# Patient Record
Sex: MALE | Race: WHITE | Employment: FULL TIME | URBAN - METROPOLITAN AREA
[De-identification: names, ages, dates, MRNs, and addresses within clinical notes are randomized per-mention and may not be internally consistent; named-entity substitution may affect disease eponyms.]

---

## 2017-01-04 ENCOUNTER — HOSPITAL ENCOUNTER (OUTPATIENT)
Dept: PHYSICAL THERAPY | Age: 52
Discharge: HOME | End: 2017-01-04
Attending: FAMILY MEDICINE
Payer: COMMERCIAL

## 2017-01-04 PROCEDURE — 97110 THERAPEUTIC EXERCISES: CPT

## 2017-01-04 PROCEDURE — 97140 MANUAL THERAPY 1/> REGIONS: CPT

## 2017-01-04 NOTE — PROGRESS NOTES
Ricardo Crooks  : 1965 [facility] at [address] [place] [address]  [phone]   [phone]         OUTPATIENT PHYSICAL THERAPY: Daily Note 2017   ICD-10: Treatment Diagnosis: Low back pain (M54.5)  , Lumbago with sciatica, left side (M54.42)  Precautions/Allergies:   Review of patient's allergies indicates no known allergies. Fall Risk Score: 2 (? 5 = High Risk)  MD Orders: evaluate and treat MEDICAL/REFERRING DIAGNOSIS:  Persons encountering health services in other specified circumstances [Z76.89]   DATE OF ONSET: three months ago   REFERRING PHYSICIAN: Mary Hernandez MD  RETURN PHYSICIAN APPOINTMENT:    Initial Assessment:  Mr. Pedro Rendon presents with decreased lumbar spine ROM, decreased postural awareness, difficulty with sitting, and decreased hip mobility. He will benefit from skilled PT to address his current impairments and functional limitations. PROBLEM LIST (Impacting functional limitations):  1. Decreased ADL/Functional Activities  2. Decreased Transfer Abilities  3. Decreased Ambulation Ability/Technique  4. Increased Pain  5. Decreased Activity Tolerance  6. Decreased Flexibility/Joint Mobility  7. impaired body mechanics with transfers and sitting   INTERVENTIONS PLANNED:  1. Balance Exercise  2. Bed Mobility  3. Cold  4. Gait Training  5. Heat  6. Home Exercise Program (HEP)  7. Manual Therapy  8. Neuromuscular Re-education/Strengthening  9. Therapeutic Exercise/Strengthening  10. Transfer Training  11. body mechanics and spine hygiene training. TREATMENT PLAN: Effective Dates: 2016 TO 3/19/2017. Frequency/Duration: 1 times a week for 6 weeks (decreased frequency to 1 time a week)    GOALS: (Goals have been discussed and agreed upon with patient.)  SHORT-TERM FUNCTIONAL GOALS: Time Frame: two to three weeks  1.  Patient demonstrates independence with initial home exercise program without verbal cueing provided by therapist.  2. Patient to report demonstrate safe and pain free supine-sit transfers using safe mechanics. 3. Patient to demonstrate safe sit-stand transfers with decreased forward trunk lean and decreased spinal loading. 4. Patient to report a greater than 50% improvement in sitting tolerance with driving. DISCHARGE GOALS: Time Frame: four to six weeks  1. Patient to demonstrate a better than 10 point improvement in modified oswestry back index score. 2. Patient to report that his max pain level during all transfers to be less than 2/10. 3.   Patient to report pain no greater than 2/10 with sitting during work duties and travel in cars or flying. Rehabilitation Potential For Stated Goals: Good  Regarding Dani Jaimes Edgar's therapy, I certify that the treatment plan above will be carried out by a therapist or under their direction. Thank you for this referral,  Aarti Leonard PT     Referring Physician Signature: Emma Vernon MD _________________________ Date _________            HISTORY:   Present Symptoms: Patient reports improved back back pain with less feeling of tightness. I was sore after last time, but I can move better. Pain Intensity 1: 2   History of Present Injury/Illness (Reason for Referral):  Mr. Tiara Santoro reports gradual onset of left lower back and SI pin that began to progressively increase over the past few months. This has led to difficulty with travel, sitting at work, sitting during driving, and difficulty with bending. He also reports some nightly sleep disturbance. His symptoms are improved with standing and his goals are to safely resume exercise and to be able to sit safely during work, commuting, and travel. Past Medical History/Comorbidities:   Mr. Tiara Santoro  has a past medical history of Asthma and Hypercholesterolemia.   Mr. Tiara Santoro  has a past surgical history that includes cyst removal.  Social History/Living Environment:     lives with family in two story home. Frequent sitting at work and flying for work. Prior Level of Function/Work/Activity:  Independent with all daily home and work duties. Other Clinical Tests:          Negative for acute injury  Current Medications:    Current Outpatient Prescriptions:     HYDROcodone-acetaminophen (NORCO) 7.5-325 mg per tablet, Take 1 Tab by mouth every six (6) hours as needed for Pain. Max Daily Amount: 4 Tabs., Disp: 40 Tab, Rfl: 0    meloxicam (MOBIC) 15 mg tablet, Take 1 Tab by mouth daily. , Disp: 30 Tab, Rfl: 0    CRESTOR 10 mg tablet, TAKE 1/2 TABLET BY MOUTH EVERY DAY, Disp: 90 Tab, Rfl: 2   Date Last Reviewed:  12/19/2016   # of Personal Factors/Comorbidities that affect the Plan of Care: 0: LOW COMPLEXITY   EXAMINATION:   Observation/Orthostatic Postural Assessment:          Decreased hip extension during gait. Increased forward scapular position. Palpation:          Elevated left ilium in standing and sitting. ROM:          ROM:           Lumbar extension: 75%   Lumbar flexion: 50%   Lumbar left side bend: 50%   Lumbar right side bend: 50%   Hip ROM within normal limits bilaterally  Strength:     Manual Muscle Test (out of 5) Left Right   Knee extension 5 5   Knee flexion 5 5   Hip flexion 5 5   Hip extension 4 5   Hip abduction 4 5   Ankle DF 5 5   Ankle PF 5 5             Special Tests:          +thigh thrust on left        +emanuel on left        +long sit test on left with increased left LE anterior rotated ilial position  Functional Mobility:         Transfers:  Increased forward trunk lean with sit-stand transfers        Bed Mobility:  Increased lumbar spine load with supine-sit and decreased pain with correction of log roll   Body Structures Involved:  1. Joints  2. Muscles  3. Ligaments Body Functions Affected:  1. Neuromusculoskeletal  2. Movement Related Activities and Participation Affected:  1. Mobility  2. Self Care  3. Domestic Life  4.  Community, Social and [place]   # of elements that affect the Plan of Care: 1-2: LOW COMPLEXITY   CLINICAL PRESENTATION:   Presentation: Stable and uncomplicated: LOW COMPLEXITY   CLINICAL DECISION MAKING:   Outcome Measure: Tool Used: Modified Oswestry Low Back Pain Questionnaire  Score:  Initial: 18/50  Most Recent: X/50 (Date: -- )   Interpretation of Score: Each section is scored on a 0-5 scale, 5 representing the greatest disability. The scores of each section are added together for a total score of 50. Score 0 1-10 11-20 21-30 31-40 41-49 50   Modifier CH CI CJ CK CL CM CN       Medical Necessity:   · Skilled intervention continues to be required due to current functional impairments and limitations. Reason for Services/Other Comments:  · Patient continues to require skilled intervention due to pain with transfers and sitting. Use of outcome tool(s) and clinical judgement create a POC that gives a: Questionable prediction of patient's progress: MODERATE COMPLEXITY   TREATMENT:   (In addition to Assessment/Re-Assessment sessions the following treatments were rendered)  THERAPEUTIC EXERCISE: (15 minutes):  Exercises per grid below to improve mobility, strength and balance. Required moderate visual and manual cues to promote proper body alignment, promote proper body posture and promote proper body mechanics. Progressed resistance, range and repetitions as indicated. Date:  12/19/2016 Date:  12/28/2016 Date:  1/4/17   Activity/Exercise Parameters Parameters Parameters   Piriformis stretch x5' x5' X 2 min   Patient education on safe postural motion with sitting and transfers x10' x5'  X 5 min   Bird dog  x5' X 2 min   Hamstring stretch   X 1 min B   LTR   For multifidi facilitation x 1 min to left. (relative lumbar right rotation.                    MANUAL THERAPY: (40 minutes): Joint mobilization and Soft tissue mobilization was utilized and necessary because of the patient's restricted joint motion, painful spasm and restricted motion of soft tissue. Muscle energy technique to correct Left ilial rotation. Left LE distraction manipulation. Soft tissue mobilization to left lumbar paraspinals. UPA glides to left L2-3, 3-4, 4-5, 5-S1, grade III, IV-. Treatment/Session Assessment:  Patient had improved pain and transfers after treatment. Continue with lumbar mobility  · Post session pain:  1/10  · Compliance with Program/Exercises: Will assess as treatment progresses. · Recommendations/Intent for next treatment session: \"Next visit will focus on advancements to more challenging activities and reduction in assistance provided\".   Total Treatment Duration:  PT Patient Time In/Time Out  Time In: 1500  Time Out: [facility], PT

## 2017-01-19 ENCOUNTER — HOSPITAL ENCOUNTER (OUTPATIENT)
Dept: PHYSICAL THERAPY | Age: 52
Discharge: HOME | End: 2017-01-19
Attending: FAMILY MEDICINE
Payer: COMMERCIAL

## 2017-01-19 PROCEDURE — 97164 PT RE-EVAL EST PLAN CARE: CPT

## 2017-01-19 PROCEDURE — 97110 THERAPEUTIC EXERCISES: CPT

## 2017-01-19 PROCEDURE — 97140 MANUAL THERAPY 1/> REGIONS: CPT

## 2017-01-19 NOTE — PROGRESS NOTES
Edwina Pantoja  : 1965 [facility] at [address] [place] [address]  [phone]   [phone]         OUTPATIENT PHYSICAL THERAPY: Daily Note, Re-evaluation and Progress Report 2017   ICD-10: Treatment Diagnosis: Low back pain (M54.5)  , Lumbago with sciatica, left side (M54.42)  Precautions/Allergies:   Review of patient's allergies indicates no known allergies. Fall Risk Score: 2 (? 5 = High Risk)  MD Orders: evaluate and treat MEDICAL/REFERRING DIAGNOSIS:  Persons encountering health services in other specified circumstances [Z76.89]   DATE OF ONSET: three months ago   REFERRING PHYSICIAN: Sedrick Marroquin, [address] [facility] 28: 2017   Initial Assessment: Edwina Pantoja has been seen for 4 visits from 16 to 2017 for low back pain. Patient has performed therapeutic exercises, activities, and had manual therapy to increased strength, ROM and function. Patient has also used modalities for pain control in order to increase function. Patient has show an increase in function per the Modified Oswestry with scores of 7/50. Patient has progressed well toward their goals and will benefit from continuing skilled PT in order to address their impairments. PROBLEM LIST (Impacting functional limitations):  1. Decreased ADL/Functional Activities  2. Decreased Transfer Abilities  3. Decreased Ambulation Ability/Technique  4. Increased Pain  5. Decreased Activity Tolerance  6. Decreased Flexibility/Joint Mobility  7. impaired body mechanics with transfers and sitting   INTERVENTIONS PLANNED:  1. Balance Exercise  2. Bed Mobility  3. Cold  4. Gait Training  5. Heat  6. Home Exercise Program (HEP)  7. Manual Therapy  8. Neuromuscular Re-education/Strengthening  9. Therapeutic Exercise/Strengthening  10. Transfer Training  11. body mechanics and spine hygiene training. TREATMENT PLAN: Effective Dates: 2016 TO 3/19/2017. Frequency/Duration:  (decreased frequency to 1 time a month)    GOALS: (Goals have been discussed and agreed upon with patient.)  SHORT-TERM FUNCTIONAL GOALS: Time Frame: two to three weeks  1. Patient demonstrates independence with initial home exercise program without verbal cueing provided by therapist. (MET  2. Patient to report demonstrate safe and pain free supine-sit transfers using safe mechanics. (MET  3. Patient to demonstrate safe sit-stand transfers with decreased forward trunk lean and decreased spinal loading. (MET  4. Patient to report a greater than 50% improvement in sitting tolerance with driving. (MET  DISCHARGE GOALS: Time Frame:12 weeks  1. Patient to demonstrate a better than 10 point improvement in modified oswestry back index score. (MET  2. Patient to report that his max pain level during all transfers to be less than 2/10. (ongoing)                         3.   Patient to report pain no greater than 2/10 with sitting during work duties and travel in cars or [address] (ongoing)  NEW GOAL:  Patient will report playing a full round of golf without pain    Rehabilitation Potential For Stated Goals: Good  Regarding Dank Hernández's therapy, I certify that the treatment plan above will be carried out by a therapist or under their direction. Thank you for this referral,  Cheri Swenson, PT     Referring Physician Signature: Omid Nunez MD _________________________ Date _________            HISTORY:   Present Symptoms: Patient reports improved back back pain with less feeling of tightness. I still feel it with sitting every now and then, but then do my exercises. Pain Intensity 1: 1   History of Present Injury/Illness (Reason for Referral):  Mr. Melissa Cardona reports gradual onset of left lower back and SI pin that began to progressively increase over the past few months. This has led to difficulty with travel, sitting at work, sitting during driving, and difficulty with bending.   He also reports some nightly sleep disturbance. His symptoms are improved with standing and his goals are to safely resume exercise and to be able to sit safely during work, commuting, and travel. Past Medical History/Comorbidities:   Mr. Jourdan Olguin  has a past medical history of Asthma and Hypercholesterolemia. Mr. Jourdan Olguin  has a past surgical history that includes cyst removal.  Social History/Living Environment:     lives with family in two story home. Frequent sitting at work and flying for work. Prior Level of Function/Work/Activity:  Independent with all daily home and work duties. Other Clinical Tests:          Negative for acute injury  Current Medications:    Current Outpatient Prescriptions:     meloxicam (MOBIC) 15 mg tablet, TAKE 1 TAB BY MOUTH DAILY. , Disp: 30 Tab, Rfl: 0    HYDROcodone-acetaminophen (NORCO) 7.5-325 mg per tablet, Take 1 Tab by mouth every six (6) hours as needed for Pain. Max Daily Amount: 4 Tabs., Disp: 40 Tab, Rfl: 0    CRESTOR 10 mg tablet, TAKE 1/2 TABLET BY MOUTH EVERY DAY, Disp: 90 Tab, Rfl: 2   Date Last Reviewed:  12/19/2016   # of Personal Factors/Comorbidities that affect the Plan of Care: 0: LOW COMPLEXITY   EXAMINATION:1/19/17   Observation/Orthostatic Postural Assessment:          Decreased hip extension during gait. Increased forward scapular position.   Palpation:          Good ilium elevation B (even)  ROM:          ROM:           Lumbar extension: 85%   Lumbar flexion: 75%   Lumbar left side bend: 60%   Lumbar right side bend: 60%   Hip ROM within normal limits bilaterally  Strength:     Manual Muscle Test (out of 5) Left Right   Knee extension 5 5   Knee flexion 5 5   Hip flexion 5 5   Hip extension 4+ 5   Hip abduction 4+ 5   Ankle DF 5 5   Ankle PF 5 5             Special Tests:          +thigh thrust on left        -emanuel on left        +long sit test on left with increased left LE anterior rotated ilial position  Functional Mobility:         Transfers:  Increased forward trunk lean with sit-stand transfers           Body Structures Involved:  1. Joints  2. Muscles  3. Ligaments Body Functions Affected:  1. Neuromusculoskeletal  2. Movement Related Activities and Participation Affected:  1. Mobility  2. Self Care  3. Domestic Life  4. Community, Social and [place]   # of elements that affect the Plan of Care: 1-2: LOW COMPLEXITY   CLINICAL PRESENTATION:   Presentation: Stable and uncomplicated: LOW COMPLEXITY   CLINICAL DECISION MAKING:   Outcome Measure: Tool Used: Modified Oswestry Low Back Pain Questionnaire  Score:  Initial: 18/50  Most Recent: 7/50 (Date: 1/19/17 )   Interpretation of Score: Each section is scored on a 0-5 scale, 5 representing the greatest disability. The scores of each section are added together for a total score of 50. Score 0 1-10 11-20 21-30 31-40 41-49 50   Modifier CH CI CJ CK CL CM CN       Medical Necessity:   · Skilled intervention continues to be required due to current functional impairments and limitations. Reason for Services/Other Comments:  · Patient continues to require skilled intervention due to pain with transfers and sitting. Use of outcome tool(s) and clinical judgement create a POC that gives a: Questionable prediction of patient's progress: MODERATE COMPLEXITY   TREATMENT:   (In addition to Assessment/Re-Assessment sessions the following treatments were rendered)  THERAPEUTIC EXERCISE: (15 minutes):  Exercises per grid below to improve mobility, strength and balance. Required moderate visual and manual cues to promote proper body alignment, promote proper body posture and promote proper body mechanics. Progressed resistance, range and repetitions as indicated.    Date:  12/28/2016 Date:  1/4/17 Date:  1/19/17   Activity/Exercise Parameters Parameters    Piriformis stretch x5' X 2 min X 2 min B   Patient education on safe postural motion with sitting and transfers x5'  X 5 min    Bird dog x5' X 2 min    Hamstring stretch  X 1 min B X 1 min B   LTR  For multifidi facilitation x 1 min to left. (relative lumbar right rotation. For multifidi facilitation x 1 min to left. (relative lumbar right rotation. Core training   Bridge with facilitation x 5 min  With abduction with green band x 20  -   Hip abduction   Green band in hook lying x 2 min   Cat cow   2 min                   MANUAL THERAPY: (30 minutes): Joint mobilization and Soft tissue mobilization was utilized and necessary because of the patient's restricted joint motion, painful spasm and restricted motion of soft tissue. Muscle energy technique to correct Left ilial rotation. Soft tissue mobilization to left lumbar paraspinals. UPA glides to left L2-3, 3-4, 4-5, 5-S1, grade III, IV-. Treatment/Session Assessment:  Patient had improved pain and transfers after treatment. Follow up with patient in one month if needed. If not, then discharge at that time  · Post session pain:  0/10  · Compliance with Program/Exercises: Will assess as treatment progresses. · Recommendations/Intent for next treatment session: \"Next visit will focus on advancements to more challenging activities and reduction in assistance provided\".   Total Treatment Duration:  PT Patient Time In/Time Out  Time In: 1500  Time Out: 2[facility]

## 2017-04-26 NOTE — PROGRESS NOTES
Tania Juarez  : 1965 [facility] at [address] [place] [address]  [phone]   [phone]         OUTPATIENT PHYSICAL THERAPY: Discharge and Discontinuation Summary 2017   ICD-10: Treatment Diagnosis: Low back pain (M54.5)  , Lumbago with sciatica, left side (M54.42)  Precautions/Allergies:   Review of patient's allergies indicates no known allergies. Fall Risk Score: 2 (? 5 = High Risk)  MD Orders: evaluate and treat MEDICAL/REFERRING DIAGNOSIS:  Persons encountering health services in other specified circumstances [Z76.89]   DATE OF ONSET: three months ago   REFERRING PHYSICIAN: Ginny Hernandez [id number] [facility] 28: 2017   Initial Assessment: Tania Juarez has been seen for 4 visits from 16 to 17 for low back pain. Patient has performed therapeutic exercises, activities, and had manual therapy to increased strength, ROM and function. Patient has also used modalities for pain control in order to increase function. Patient has show an increase in function per the Modified Oswestry with scores of 7/50. Patient did not return after re-evaluation and will be discharged at this time. He partially met his goals for therapy. Please re-order therapy if further is needed. Thank you for the referral.  Mira Barrera, PT, DPT, OCS  2017         PROBLEM LIST (Impacting functional limitations):  1. Decreased ADL/Functional Activities  2. Decreased Transfer Abilities  3. Decreased Ambulation Ability/Technique  4. Increased Pain  5. Decreased Activity Tolerance  6. Decreased Flexibility/Joint Mobility  7. impaired body mechanics with transfers and sitting   INTERVENTIONS PLANNED:  1. Balance Exercise  2. Bed Mobility  3. Cold  4. Gait Training  5. Heat  6. Home Exercise Program (HEP)  7. Manual Therapy  8. Neuromuscular Re-education/Strengthening  9. Therapeutic Exercise/Strengthening  10.  Transfer Training  11. body mechanics and spine hygiene training. TREATMENT PLAN: Effective Dates: 12/19/2016 TO 3/19/2017. Frequency/Duration:  (decreased frequency to 1 time a month)    GOALS: (Goals have been discussed and agreed upon with patient.)  SHORT-TERM FUNCTIONAL GOALS: Time Frame: two to three weeks  1. Patient demonstrates independence with initial home exercise program without verbal cueing provided by therapist. (MET  2. Patient to report demonstrate safe and pain free supine-sit transfers using safe mechanics. (MET  3. Patient to demonstrate safe sit-stand transfers with decreased forward trunk lean and decreased spinal loading. (MET  4. Patient to report a greater than 50% improvement in sitting tolerance with driving. (MET  DISCHARGE GOALS: Time Frame:12 weeks  1. Patient to demonstrate a better than 10 point improvement in modified oswestry back index score. (MET  2. Patient to report that his max pain level during all transfers to be less than 2/10. (NOT MET)                         3.   Patient to report pain no greater than 2/10 with sitting during work duties and travel in cars or flying. (NOT MET)  NEW GOAL:  Patient will report playing a full round of golf without pain (NOT MET    Rehabilitation Potential For Stated Goals: Good  Regarding Juan Francisco Hernández's therapy, I certify that the treatment plan above will be carried out by a therapist or under their direction.   Thank you for this referral,  Gabriel Simpson PT     Referring Physician Signature: Kamlesh Nath MD _________________________ Date _________

## 2017-05-30 ENCOUNTER — APPOINTMENT (OUTPATIENT)
Dept: ULTRASOUND IMAGING | Age: 52
End: 2017-05-30
Attending: FAMILY MEDICINE

## 2017-05-31 ENCOUNTER — HOSPITAL ENCOUNTER (OUTPATIENT)
Dept: ULTRASOUND IMAGING | Age: 52
Discharge: HOME | End: 2017-05-31
Attending: FAMILY MEDICINE
Payer: COMMERCIAL

## 2017-05-31 DIAGNOSIS — R74.8 ELEVATED LIVER ENZYMES: ICD-10-CM

## 2017-05-31 PROCEDURE — 76700 US EXAM ABDOM COMPLETE: CPT

## 2019-11-13 ENCOUNTER — HOSPITAL ENCOUNTER (OUTPATIENT)
Dept: GENERAL RADIOLOGY | Age: 54
Discharge: HOME | End: 2019-11-13
Attending: FAMILY MEDICINE
Payer: COMMERCIAL

## 2019-11-13 DIAGNOSIS — J20.9 ACUTE BRONCHITIS, UNSPECIFIED ORGANISM: ICD-10-CM

## 2019-11-13 PROCEDURE — 71046 X-RAY EXAM CHEST 2 VIEWS: CPT

## 2020-01-24 ENCOUNTER — APPOINTMENT (OUTPATIENT)
Dept: GENERAL RADIOLOGY | Age: 55
End: 2020-01-24
Attending: EMERGENCY MEDICINE
Payer: COMMERCIAL

## 2020-01-24 ENCOUNTER — HOSPITAL ENCOUNTER (EMERGENCY)
Age: 55
Discharge: HOME | End: 2020-01-24
Attending: EMERGENCY MEDICINE
Payer: COMMERCIAL

## 2020-01-24 ENCOUNTER — APPOINTMENT (OUTPATIENT)
Dept: CT IMAGING | Age: 55
End: 2020-01-24
Attending: EMERGENCY MEDICINE
Payer: COMMERCIAL

## 2020-01-24 VITALS
WEIGHT: 225 LBS | TEMPERATURE: 98 F | DIASTOLIC BLOOD PRESSURE: 74 MMHG | BODY MASS INDEX: 29.82 KG/M2 | RESPIRATION RATE: 18 BRPM | OXYGEN SATURATION: 96 % | SYSTOLIC BLOOD PRESSURE: 128 MMHG | HEIGHT: 73 IN | HEART RATE: 65 BPM

## 2020-01-24 DIAGNOSIS — Z98.890 STATUS POST COLONOSCOPY WITH POLYPECTOMY: ICD-10-CM

## 2020-01-24 DIAGNOSIS — R14.1 ABDOMINAL GAS PAIN: Primary | ICD-10-CM

## 2020-01-24 LAB
ALBUMIN SERPL-MCNC: 4.3 G/DL (ref 3.5–5)
ALBUMIN/GLOB SERPL: 1.1 {RATIO} (ref 1.2–3.5)
ALP SERPL-CCNC: 57 U/L (ref 50–136)
ALT SERPL-CCNC: 84 U/L (ref 12–65)
ANION GAP SERPL CALC-SCNC: 6 MMOL/L (ref 7–16)
AST SERPL-CCNC: 34 U/L (ref 15–37)
BASOPHILS # BLD: 0 K/UL (ref 0–0.2)
BASOPHILS NFR BLD: 1 % (ref 0–2)
BILIRUB SERPL-MCNC: 0.7 MG/DL (ref 0.2–1.1)
BUN SERPL-MCNC: 9 MG/DL (ref 6–23)
CALCIUM SERPL-MCNC: 9.2 MG/DL (ref 8.3–10.4)
CHLORIDE SERPL-SCNC: 106 MMOL/L (ref 98–107)
CO2 SERPL-SCNC: 27 MMOL/L (ref 21–32)
CREAT SERPL-MCNC: 0.99 MG/DL (ref 0.8–1.5)
DIFFERENTIAL METHOD BLD: NORMAL
EOSINOPHIL # BLD: 0.1 K/UL (ref 0–0.8)
EOSINOPHIL NFR BLD: 2 % (ref 0.5–7.8)
ERYTHROCYTE [DISTWIDTH] IN BLOOD BY AUTOMATED COUNT: 12.6 % (ref 11.9–14.6)
GLOBULIN SER CALC-MCNC: 3.9 G/DL (ref 2.3–3.5)
GLUCOSE SERPL-MCNC: 104 MG/DL (ref 65–100)
HCT VFR BLD AUTO: 44.9 % (ref 41.1–50.3)
HGB BLD-MCNC: 15 G/DL (ref 13.6–17.2)
IMM GRANULOCYTES # BLD AUTO: 0 K/UL (ref 0–0.5)
IMM GRANULOCYTES NFR BLD AUTO: 0 % (ref 0–5)
LYMPHOCYTES # BLD: 1.4 K/UL (ref 0.5–4.6)
LYMPHOCYTES NFR BLD: 21 % (ref 13–44)
MCH RBC QN AUTO: 30.7 PG (ref 26.1–32.9)
MCHC RBC AUTO-ENTMCNC: 33.4 G/DL (ref 31.4–35)
MCV RBC AUTO: 92 FL (ref 79.6–97.8)
MONOCYTES # BLD: 0.6 K/UL (ref 0.1–1.3)
MONOCYTES NFR BLD: 9 % (ref 4–12)
NEUTS SEG # BLD: 4.4 K/UL (ref 1.7–8.2)
NEUTS SEG NFR BLD: 67 % (ref 43–78)
NRBC # BLD: 0 K/UL (ref 0–0.2)
PLATELET # BLD AUTO: 177 K/UL (ref 150–450)
PMV BLD AUTO: 10.7 FL (ref 9.4–12.3)
POTASSIUM SERPL-SCNC: 3.8 MMOL/L (ref 3.5–5.1)
PROT SERPL-MCNC: 8.2 G/DL (ref 6.3–8.2)
RBC # BLD AUTO: 4.88 M/UL (ref 4.23–5.6)
SODIUM SERPL-SCNC: 139 MMOL/L (ref 136–145)
WBC # BLD AUTO: 6.6 K/UL (ref 4.3–11.1)

## 2020-01-24 PROCEDURE — 85025 COMPLETE CBC W/AUTO DIFF WBC: CPT

## 2020-01-24 PROCEDURE — 96374 THER/PROPH/DIAG INJ IV PUSH: CPT

## 2020-01-24 PROCEDURE — 96375 TX/PRO/DX INJ NEW DRUG ADDON: CPT

## 2020-01-24 PROCEDURE — 99284 EMERGENCY DEPT VISIT MOD MDM: CPT

## 2020-01-24 PROCEDURE — 74011000258 HC RX REV CODE- 258: Performed by: EMERGENCY MEDICINE

## 2020-01-24 PROCEDURE — 74177 CT ABD & PELVIS W/CONTRAST: CPT

## 2020-01-24 PROCEDURE — 74022 RADEX COMPL AQT ABD SERIES: CPT

## 2020-01-24 PROCEDURE — 74011250637 HC RX REV CODE- 250/637: Performed by: EMERGENCY MEDICINE

## 2020-01-24 PROCEDURE — 74011250636 HC RX REV CODE- 250/636: Performed by: EMERGENCY MEDICINE

## 2020-01-24 PROCEDURE — 80053 COMPREHEN METABOLIC PANEL: CPT

## 2020-01-24 PROCEDURE — 74011636320 HC RX REV CODE- 636/320: Performed by: EMERGENCY MEDICINE

## 2020-01-24 RX ORDER — SIMETHICONE 80 MG
80 TABLET,CHEWABLE ORAL
Status: COMPLETED | OUTPATIENT
Start: 2020-01-24 | End: 2020-01-24

## 2020-01-24 RX ORDER — HYOSCYAMINE SULFATE 0.12 MG/1
0.25 TABLET SUBLINGUAL
Status: COMPLETED | OUTPATIENT
Start: 2020-01-24 | End: 2020-01-24

## 2020-01-24 RX ORDER — SODIUM CHLORIDE 0.9 % (FLUSH) 0.9 %
10 SYRINGE (ML) INJECTION
Status: COMPLETED | OUTPATIENT
Start: 2020-01-24 | End: 2020-01-24

## 2020-01-24 RX ORDER — ONDANSETRON 2 MG/ML
4 INJECTION INTRAMUSCULAR; INTRAVENOUS
Status: COMPLETED | OUTPATIENT
Start: 2020-01-24 | End: 2020-01-24

## 2020-01-24 RX ORDER — HYDROMORPHONE HYDROCHLORIDE 1 MG/ML
1 INJECTION, SOLUTION INTRAMUSCULAR; INTRAVENOUS; SUBCUTANEOUS
Status: COMPLETED | OUTPATIENT
Start: 2020-01-24 | End: 2020-01-24

## 2020-01-24 RX ORDER — FACIAL-BODY WIPES
10 EACH TOPICAL
Status: DISCONTINUED | OUTPATIENT
Start: 2020-01-24 | End: 2020-01-24 | Stop reason: HOSPADM

## 2020-01-24 RX ORDER — ACETAMINOPHEN 500 MG
1000 TABLET ORAL
Status: DISCONTINUED | OUTPATIENT
Start: 2020-01-24 | End: 2020-01-24

## 2020-01-24 RX ORDER — HYOSCYAMINE SULFATE 0.12 MG/1
0.12 TABLET SUBLINGUAL
Qty: 12 TAB | Status: SHIPPED | OUTPATIENT
Start: 2020-01-24

## 2020-01-24 RX ADMIN — HYOSCYAMINE SULFATE 0.25 MG: 0.12 TABLET ORAL; SUBLINGUAL at 15:35

## 2020-01-24 RX ADMIN — SIMETHICONE CHEW TAB 80 MG 80 MG: 80 TABLET ORAL at 15:56

## 2020-01-24 RX ADMIN — HYDROMORPHONE HYDROCHLORIDE 1 MG: 1 INJECTION, SOLUTION INTRAMUSCULAR; INTRAVENOUS; SUBCUTANEOUS at 12:04

## 2020-01-24 RX ADMIN — Medication 10 ML: at 14:20

## 2020-01-24 RX ADMIN — IOPAMIDOL 100 ML: 755 INJECTION, SOLUTION INTRAVENOUS at 14:20

## 2020-01-24 RX ADMIN — DIATRIZOATE MEGLUMINE AND DIATRIZOATE SODIUM 15 ML: 660; 100 LIQUID ORAL; RECTAL at 12:44

## 2020-01-24 RX ADMIN — ONDANSETRON 4 MG: 2 INJECTION INTRAMUSCULAR; INTRAVENOUS at 12:04

## 2020-01-24 RX ADMIN — SODIUM CHLORIDE 100 ML: 900 INJECTION, SOLUTION INTRAVENOUS at 14:20

## 2020-01-24 NOTE — DISCHARGE INSTRUCTIONS
Patient Education      Take over-the-counter Gas-X as needed as directed. Levsin as prescribed for abdominal cramping. Clear liquids this evening and advance diet tomorrow if tolerated. Return if any new, worsening or concerning symptoms. Follow[facility]up with [facility] as directed by Dr. Anabel Long. Gas and Bloating: Care Instructions  Your Care Instructions    Gas and bloating can be uncomfortable and embarrassing problems. All people pass gas, but some people produce more gas than others, sometimes enough to cause distress. It is normal to pass gas from 6 to 20 times per day. Excess gas usually is not caused by a serious health problem. Gas and bloating usually are caused by something you eat or drink, including some food supplements and medicines. Gas and bloating are usually harmless and go away without treatment. However, changing your diet can help end the problem. Some over-the-counter medicines can help prevent gas and relieve bloating. Follow-up care is a key part of your treatment and safety. Be sure to make and go to all appointments, and call your doctor if you are having problems. It's also a good idea to know your test results and keep a list of the medicines you take. How can you care for yourself at home? · Keep a food diary if you think a food gives you gas. Write down what you eat or drink. Also record when you get gas. If you notice that a food seems to cause your gas each time, avoid it and see if the gas goes away. Examples of foods that cause gas include:  ? Fried and fatty foods. ? Beans. ? Vegetables such as artichokes, asparagus, broccoli, brussels sprouts, cabbage, cauliflower, cucumbers, green peppers, onions, peas, radishes, and raw potatoes. ? Fruits such as apricots, bananas, melons, peaches, pears, prunes, and raw apples. ? Wheat and wheat bran. · Soak dry beans in water overnight, then dump the water and cook the soaked beans in new water.  This can help prevent gas and bloating. · If you have problems with lactose, avoid dairy products such as milk and cheese. · Try not to swallow air. Do not drink through a straw, gulp your food, or chew gum. · Take an over-the-counter medicine. Read and follow all instructions on the label. ? Food enzymes, such as Beano, can be added to gas-producing foods to prevent gas. ? Antacids, such as Maalox Anti-Gas and Mylanta Gas, can relieve bloating by making you burp. Be careful when you take over-the-counter antacid medicines. Many of these medicines have aspirin in them. Read the label to make sure that you are not taking more than the recommended dose. Too much aspirin can be harmful. ? Activated charcoal tablets, such as CharcoCaps, may decrease odor from gas you pass. ? If you have problems with lactose, you can take medicines such as Dairy Ease and Lactaid with dairy products to prevent gas and bloating. · Get some exercise regularly. When should you call for help? Call 911 anytime you think you may need emergency care. For example, call if:    · You have gas and signs of a heart attack, such as:  ? Chest pain or pressure. ? Sweating. ? Shortness of breath. ? Nausea or vomiting. ? Pain that spreads from the chest to the neck, jaw, or one or both shoulders or arms. ? Dizziness or lightheadedness. ? A fast or uneven pulse. After calling 911, chew 1 adult-strength aspirin. Wait for an ambulance. Do not try to drive yourself.    Call your doctor now or seek immediate medical care if:    · You have severe belly pain.     · You have blood in your stool.    Watch closely for changes in your health, and be sure to contact your doctor if:    · You have blood or pus in your urine.     · Your urine is cloudy or smells bad.     · You are burping and have trouble swallowing.     · You feel bloated and have swelling in your belly.     · You do not get better as expected. Where can you learn more?   Go to http://andreea-gian.info/. Enter H902 in the search box to learn more about \"Gas and Bloating: Care Instructions. \"  Current as of: June 26, 2019  Content Version: 12.2  © 8074-3115 [employer]. Care instructions adapted under license by [employer] (which disclaims liability or warranty for this information). If you have questions about a medical condition or this instruction, always ask your healthcare professional. [address] any warranty or liability for your use of this information.

## 2020-01-24 NOTE — ED TRIAGE NOTES
Pt states he had a colonoscopy this morning at 0900 by Dr Erica Phan. Per spouse he passed a little bit of gas post procedure. On the way home patient began having severe abdominal pain and they went back to the office. Patient was instructed to come the ED by Dr Erica Phan. A small polyp was removed per spouse.

## 2020-01-24 NOTE — ED NOTES
I have reviewed discharge instructions with the patient. The patient verbalized understanding. Patient left ED via Discharge Method: ambulatory to Home with wife). Opportunity for questions and clarification provided. Patient given 1 scripts. To continue your aftercare when you leave the hospital, you may receive an automated call from our care team to check in on how you are doing. This is a free service and part of our promise to provide the best care and service to meet your aftercare needs.  If you have questions, or wish to unsubscribe from this service please call [phone]. Thank you for Choosing our [facility].

## 2020-01-27 NOTE — PROGRESS NOTES
Saw your colonoscopy, given your breathing during the test , gastro thought you should get checked for sleep apnea. Would like to arrange this but , of course , need your permission. This can be a very serious issue and evaluation makes sense.

## 2020-01-30 NOTE — PROGRESS NOTES
Patient was notified, voiced understanding. He is ok with being referred for a sleep study. He wants to do the in home sleep study, and not have to stay overnight in a facility.

## 2020-02-26 ENCOUNTER — HOSPITAL ENCOUNTER (OUTPATIENT)
Dept: SLEEP MEDICINE | Age: 55
Discharge: HOME | End: 2020-02-26
Payer: COMMERCIAL

## 2020-02-26 PROCEDURE — 95806 SLEEP STUDY UNATT&RESP EFFT: CPT

## 2020-04-03 PROBLEM — G47.34 NOCTURNAL HYPOXEMIA: Status: ACTIVE | Noted: 2020-04-03

## 2020-04-03 PROBLEM — G47.8 NON-RESTORATIVE SLEEP: Status: ACTIVE | Noted: 2020-04-03

## 2020-04-03 PROBLEM — G47.33 OSA (OBSTRUCTIVE SLEEP APNEA): Status: ACTIVE | Noted: 2020-04-03

## 2020-04-03 PROBLEM — G47.10 HYPERSOMNIA: Status: ACTIVE | Noted: 2020-04-03
